# Patient Record
Sex: MALE | Race: WHITE | NOT HISPANIC OR LATINO
[De-identification: names, ages, dates, MRNs, and addresses within clinical notes are randomized per-mention and may not be internally consistent; named-entity substitution may affect disease eponyms.]

---

## 2020-11-04 PROBLEM — Z00.00 ENCOUNTER FOR PREVENTIVE HEALTH EXAMINATION: Status: ACTIVE | Noted: 2020-11-04

## 2020-11-09 ENCOUNTER — APPOINTMENT (OUTPATIENT)
Dept: ORTHOPEDIC SURGERY | Facility: CLINIC | Age: 80
End: 2020-11-09
Payer: MEDICARE

## 2020-11-09 VITALS — BODY MASS INDEX: 28.12 KG/M2 | WEIGHT: 175 LBS | HEIGHT: 66 IN

## 2020-11-09 DIAGNOSIS — Z56.0 UNEMPLOYMENT, UNSPECIFIED: ICD-10-CM

## 2020-11-09 DIAGNOSIS — Z78.9 OTHER SPECIFIED HEALTH STATUS: ICD-10-CM

## 2020-11-09 DIAGNOSIS — I10 ESSENTIAL (PRIMARY) HYPERTENSION: ICD-10-CM

## 2020-11-09 PROCEDURE — 99072 ADDL SUPL MATRL&STAF TM PHE: CPT

## 2020-11-09 PROCEDURE — 99204 OFFICE O/P NEW MOD 45 MIN: CPT | Mod: 25

## 2020-11-09 PROCEDURE — 73564 X-RAY EXAM KNEE 4 OR MORE: CPT | Mod: LT

## 2020-11-09 PROCEDURE — 20610 DRAIN/INJ JOINT/BURSA W/O US: CPT | Mod: LT

## 2020-11-09 RX ORDER — AMOXICILLIN 500 MG/1
500 CAPSULE ORAL
Qty: 21 | Refills: 0 | Status: ACTIVE | COMMUNITY
Start: 2020-06-25

## 2020-11-09 RX ORDER — DOXYCYCLINE 100 MG/1
100 CAPSULE ORAL
Qty: 14 | Refills: 0 | Status: ACTIVE | COMMUNITY
Start: 2020-07-15

## 2020-11-09 RX ORDER — CHLORHEXIDINE GLUCONATE, 0.12% ORAL RINSE 1.2 MG/ML
0.12 SOLUTION DENTAL
Qty: 473 | Refills: 0 | Status: ACTIVE | COMMUNITY
Start: 2020-06-25

## 2020-11-09 RX ORDER — MUPIROCIN 20 MG/G
2 OINTMENT TOPICAL
Qty: 22 | Refills: 0 | Status: ACTIVE | COMMUNITY
Start: 2020-07-03

## 2020-11-09 RX ORDER — CEFADROXIL 500 MG/1
500 CAPSULE ORAL
Qty: 20 | Refills: 0 | Status: ACTIVE | COMMUNITY
Start: 2020-10-28

## 2020-11-09 RX ORDER — AMLODIPINE BESYLATE 10 MG/1
10 TABLET ORAL
Qty: 90 | Refills: 0 | Status: ACTIVE | COMMUNITY
Start: 2020-10-28

## 2020-11-09 RX ORDER — DOXYCYCLINE HYCLATE 100 MG/1
100 CAPSULE ORAL
Qty: 14 | Refills: 0 | Status: ACTIVE | COMMUNITY
Start: 2020-10-20

## 2020-11-09 SDOH — ECONOMIC STABILITY - INCOME SECURITY: UNEMPLOYMENT, UNSPECIFIED: Z56.0

## 2020-11-09 NOTE — PROCEDURE
[Aspiration] : Aspiration [Left] : of the left [Prepatellar Bursa] : prepatellar bursa [Inflammation] : Inflammation [Diagnostic] : Diagnostic [Patient] : patient [Risk] : risk [Benefits] : benefits [Alternatives] : alternatives [Bleeding] : bleeding [Infection] : infection [Verbal Consent Obtained] : verbal consent was obtained prior to the procedure [Betadine] : Betadine [Ethyl Chloride Spray] : ethyl chloride spray was used as a topical anesthetic [Superior] : superior [18] : an 18-gauge [___ mL Fluid] : [unfilled] mL of [Bloody] : bloody [Bandage Applied] : a bandage [Culture] : culture [Tolerated Well] : The patient tolerated the procedure well [None] : none

## 2020-11-09 NOTE — PHYSICAL EXAM
[de-identified] : Patient is well nourished, well-developed, in no acute distress, with appropriate mood and affect. The patient is oriented to time, place, and person. Respirations are even and unlabored. Gait evaluation does not reveal a limp. There is no inguinal adenopathy. Examination of the contralateral knee shows normal range of motion, strength, no tenderness, and intact skin. The affected limb is well-perfused and showed 2+ dp/pt pulses, without skin lesions, shows a grossly normal motor and sensory examination. Knee motion is painless and the knee moves from  degrees. The knee is stable within that range-of-motion to AP and ML stress with a 1A Lachman, negative anterior or posterior drawer and no instability to varus or valgus stress. The alignment of the knee is 5 degrees varus.  No crepitus is noted.  2+ swelling noted over the prepatellar bursa with erythema that tracks down his leg.  No tenderness to palpation about the medial or lateral joint line, medial or lateral tibial plateau, medial or lateral femoral condyle, medial or lateral patellar facets, superior or inferior pole of the patella. Columba's is negative. Muscle strength is normal. Pedal pulses are palpable. Hip examination was negative. [de-identified] : Long standing knee, AP knee, lateral knee, and patellar views of the left knee were ordered and taken in the office and demonstrate mild degenerative joint disease of the knee with joint space narrowing, osteophyte formation, and subchondral sclerosis.  Swelling is noted over the left knee prepatellar bursa on the x-ray.

## 2020-11-09 NOTE — DISCUSSION/SUMMARY
[de-identified] : This patient has left knee prepatellar bursitis as well as mild left knee osteoarthritis.  It is difficult to distinguish if this is a septic or aseptic prepatellar bursitis.  He does have erythema.  To this end I would like to get an ESR and a CRP.  Also today we performed an aspiration of the knee entering the knee from a nonerythematous portal site and then tracking the needle into the knee and we were able to aspirate approximately 3 mL of bloody fluid.  Compression wrap was placed in the knee.  This will be sent for culture.  I will place the patient on Bactrim DS twice daily for 2 weeks prophylactically.  Follow-up in 1 week for recheck.

## 2020-11-09 NOTE — HISTORY OF PRESENT ILLNESS
[de-identified] : This is very nice 79-year-old gentleman experiencing left knee pain, which is severe in intensity. The pain substantially limits activities of daily living. Walking tolerance is reduced.  This started after he fell onto his left knee approximately 1 month ago.  At that time he had significant amount of swelling which has been resolving over the last 1 month.  He still has swelling over the anterior aspect the knee.  It hurts to Hyperflex his knee and to kneel on it.  Pain with walking up and down stairs.  No pain walking on level ground.  He does not take any medications for this.  He has not tried physical therapy for this.  No fevers or chills.  He does not use a cane or walker.  He does not use a neoprene sleeve knee brace.  The patient denies any radiation of the pain to the feet and it is not associated with numbness, tingling, or weakness.

## 2020-11-10 RX ORDER — SULFAMETHOXAZOLE AND TRIMETHOPRIM 800; 160 MG/1; MG/1
800-160 TABLET ORAL TWICE DAILY
Qty: 28 | Refills: 0 | Status: ACTIVE | COMMUNITY
Start: 2020-11-09 | End: 1900-01-01

## 2020-11-16 ENCOUNTER — APPOINTMENT (OUTPATIENT)
Dept: ORTHOPEDIC SURGERY | Facility: CLINIC | Age: 80
End: 2020-11-16
Payer: MEDICARE

## 2020-11-16 VITALS — BODY MASS INDEX: 28.12 KG/M2 | HEIGHT: 66 IN | WEIGHT: 175 LBS

## 2020-11-16 DIAGNOSIS — M17.12 UNILATERAL PRIMARY OSTEOARTHRITIS, LEFT KNEE: ICD-10-CM

## 2020-11-16 DIAGNOSIS — M70.42 PREPATELLAR BURSITIS, LEFT KNEE: ICD-10-CM

## 2020-11-16 PROCEDURE — 99214 OFFICE O/P EST MOD 30 MIN: CPT

## 2020-11-16 NOTE — HISTORY OF PRESENT ILLNESS
[de-identified] : This is very nice 79-year-old gentleman experiencing left knee pain, which is severe in intensity.  I have previously diagnosed him with left knee osteoarthritis.  At the last visit we are ruling out a septic prepatellar bursitis.  The cultures ultimately were negative.  Overall the swelling is improving.  Elevation helps.  The pain substantially limits activities of daily living. Walking tolerance is reduced.  This started after he fell onto his left knee approximately 1 month ago.  At that time he had significant amount of swelling which has been resolving over the last 1 month.  He still has swelling over the anterior aspect the knee.  It hurts to Hyperflex his knee and to kneel on it.  Pain with walking up and down stairs.  No pain walking on level ground.  He does not take any medications for this.  He has not tried physical therapy for this.  No fevers or chills.  He does not use a cane or walker.  He does not use a neoprene sleeve knee brace.  The patient denies any radiation of the pain to the feet and it is not associated with numbness, tingling, or weakness.

## 2020-11-16 NOTE — PHYSICAL EXAM
[de-identified] : Patient is well nourished, well-developed, in no acute distress, with appropriate mood and affect. The patient is oriented to time, place, and person. Respirations are even and unlabored. Gait evaluation does not reveal a limp. There is no inguinal adenopathy. Examination of the contralateral knee shows normal range of motion, strength, no tenderness, and intact skin. The affected limb is well-perfused and showed 2+ dp/pt pulses, without skin lesions, shows a grossly normal motor and sensory examination. Knee motion is painless and the knee moves from  degrees. The knee is stable within that range-of-motion to AP and ML stress with a 1A Lachman, negative anterior or posterior drawer and no instability to varus or valgus stress. The alignment of the knee is 5 degrees varus.  No crepitus is noted.  1+ swelling noted over the prepatellar bursa with improved erythema that tracks down his leg.  No tenderness to palpation about the medial or lateral joint line, medial or lateral tibial plateau, medial or lateral femoral condyle, medial or lateral patellar facets, superior or inferior pole of the patella. Columba's is negative. Muscle strength is normal. Pedal pulses are palpable. Hip examination was negative.  2+ peripheral edema in the lower leg. [de-identified] : Long standing knee, AP knee, lateral knee, and patellar views of the left knee were reviewed from the previous visit and demonstrate mild degenerative joint disease of the knee with joint space narrowing, osteophyte formation, and subchondral sclerosis.  Swelling is noted over the left knee prepatellar bursa on the x-ray.

## 2020-11-16 NOTE — DISCUSSION/SUMMARY
[de-identified] : This patient has left knee prepatellar bursitis as well as mild left knee osteoarthritis.  We have ruled out infected prepatellar bursitis.  Continue with elevation of the leg.  Suggested using a neoprene sleeve knee wrap and also suggested using a compression stocking.  Meloxicam prescribed for knee osteoarthritis pain.  Home exercise program recommended.  Follow-up was recommended in 2 months.

## 2020-11-16 NOTE — REASON FOR VISIT
[Initial Visit] : an initial visit for [Follow-Up Visit] : a follow-up visit for [Knee Pain] : knee pain [Osteoarthritis, Knee] : osteoarthritis, knee

## 2020-11-16 NOTE — REVIEW OF SYSTEMS
[Joint Pain] : joint pain [Joint Stiffness] : joint stiffness [Negative] : Heme/Lymph [Arthralgia] : arthralgia [Joint Swelling] : no joint swelling

## 2020-11-24 LAB — BACTERIA FLD CULT: NORMAL

## 2021-02-08 ENCOUNTER — APPOINTMENT (OUTPATIENT)
Dept: ORTHOPEDIC SURGERY | Facility: CLINIC | Age: 81
End: 2021-02-08

## 2021-02-09 ENCOUNTER — RX RENEWAL (OUTPATIENT)
Age: 81
End: 2021-02-09

## 2021-02-09 RX ORDER — MELOXICAM 15 MG/1
15 TABLET ORAL DAILY
Qty: 30 | Refills: 2 | Status: ACTIVE | COMMUNITY
Start: 2020-11-16 | End: 1900-01-01

## 2021-05-03 ENCOUNTER — RX RENEWAL (OUTPATIENT)
Age: 81
End: 2021-05-03

## 2021-10-25 ENCOUNTER — RX RENEWAL (OUTPATIENT)
Age: 81
End: 2021-10-25

## 2025-01-07 ENCOUNTER — NON-APPOINTMENT (OUTPATIENT)
Age: 85
End: 2025-01-07

## 2025-09-02 ENCOUNTER — NON-APPOINTMENT (OUTPATIENT)
Age: 85
End: 2025-09-02